# Patient Record
Sex: FEMALE | Race: BLACK OR AFRICAN AMERICAN | URBAN - METROPOLITAN AREA
[De-identification: names, ages, dates, MRNs, and addresses within clinical notes are randomized per-mention and may not be internally consistent; named-entity substitution may affect disease eponyms.]

---

## 2018-10-03 ENCOUNTER — APPOINTMENT (OUTPATIENT)
Dept: URBAN - METROPOLITAN AREA SURGERY 9 | Age: 78
Setting detail: DERMATOLOGY
End: 2018-10-03

## 2018-10-03 DIAGNOSIS — L81.6 OTHER DISORDERS OF DIMINISHED MELANIN FORMATION: ICD-10-CM

## 2018-10-03 DIAGNOSIS — L21.8 OTHER SEBORRHEIC DERMATITIS: ICD-10-CM

## 2018-10-03 DIAGNOSIS — H02.84 EDEMA OF EYELID: ICD-10-CM

## 2018-10-03 PROBLEM — Z92.3 PERSONAL HISTORY OF IRRADIATION: Status: ACTIVE | Noted: 2018-10-03

## 2018-10-03 PROBLEM — L81.9 DISORDER OF PIGMENTATION, UNSPECIFIED: Status: ACTIVE | Noted: 2018-10-03

## 2018-10-03 PROBLEM — J30.1 ALLERGIC RHINITIS DUE TO POLLEN: Status: ACTIVE | Noted: 2018-10-03

## 2018-10-03 PROBLEM — Z85.3 PERSONAL HISTORY OF MALIGNANT NEOPLASM OF BREAST: Status: ACTIVE | Noted: 2018-10-03

## 2018-10-03 PROBLEM — H91.90 UNSPECIFIED HEARING LOSS, UNSPECIFIED EAR: Status: ACTIVE | Noted: 2018-10-03

## 2018-10-03 PROBLEM — H02.842 EDEMA OF RIGHT LOWER EYELID: Status: ACTIVE | Noted: 2018-10-03

## 2018-10-03 PROBLEM — H02.845 EDEMA OF LEFT LOWER EYELID: Status: ACTIVE | Noted: 2018-10-03

## 2018-10-03 PROBLEM — M12.9 ARTHROPATHY, UNSPECIFIED: Status: ACTIVE | Noted: 2018-10-03

## 2018-10-03 PROCEDURE — 99202 OFFICE O/P NEW SF 15 MIN: CPT

## 2018-10-03 PROCEDURE — OTHER COUNSELING: OTHER

## 2018-10-03 PROCEDURE — OTHER PRESCRIPTION: OTHER

## 2018-10-03 PROCEDURE — OTHER TREATMENT REGIMEN: OTHER

## 2018-10-03 RX ORDER — HYDROCORTISONE 25 MG/G
OINTMENT TOPICAL
Qty: 1 | Refills: 3 | Status: ERX | COMMUNITY
Start: 2018-10-03

## 2018-10-03 ASSESSMENT — LOCATION DETAILED DESCRIPTION DERM
LOCATION DETAILED: RIGHT LATERAL INFERIOR EYELID
LOCATION DETAILED: RIGHT NASOLABIAL FOLD
LOCATION DETAILED: LEFT LATERAL INFERIOR EYELID
LOCATION DETAILED: LEFT ORAL COMMISSURE
LOCATION DETAILED: RIGHT SUPERIOR VERMILION BORDER
LOCATION DETAILED: RIGHT ORAL COMMISSURE
LOCATION DETAILED: LEFT NASOLABIAL FOLD
LOCATION DETAILED: LEFT SUPERIOR VERMILION BORDER

## 2018-10-03 ASSESSMENT — LOCATION SIMPLE DESCRIPTION DERM
LOCATION SIMPLE: LEFT INFERIOR EYELID
LOCATION SIMPLE: LEFT UPPER LIP
LOCATION SIMPLE: RIGHT UPPER LIP
LOCATION SIMPLE: RIGHT LIP
LOCATION SIMPLE: LEFT LIP
LOCATION SIMPLE: RIGHT INFERIOR EYELID

## 2018-10-03 ASSESSMENT — LOCATION ZONE DERM
LOCATION ZONE: LIP
LOCATION ZONE: EYELID

## 2018-10-03 NOTE — PROCEDURE: TREATMENT REGIMEN
Initiate Treatment: HC 2.5% ointment as directed\\nSunscreen use
Detail Level: Detailed
Plan: post inflammatory hypopigmentation vs normal variant. Explained to patient that goal of treatment is to prevent this from getting worse but not necessarily bring color back. Pt expressed understanding
Initiate Treatment: HC 2.5% ointment as directed
Detail Level: Zone

## 2018-10-03 NOTE — PROCEDURE: COUNSELING
Detail Level: Detailed
Patient Specific Counseling (Will Not Stick From Patient To Patient): Ddx discussed, use of sunscreen discussed
Patient Specific Counseling: Pt denies uncontrolled thyroid disease nor does she show signs of lupus - feel that this change is more due to age - can use eye creams or go to oculoplastics for follow up. Pt expressed understanding
Detail Level: Simple

## 2018-12-04 ENCOUNTER — APPOINTMENT (OUTPATIENT)
Dept: URBAN - METROPOLITAN AREA SURGERY 9 | Age: 78
Setting detail: DERMATOLOGY
End: 2018-12-04

## 2018-12-04 DIAGNOSIS — L21.8 OTHER SEBORRHEIC DERMATITIS: ICD-10-CM

## 2018-12-04 DIAGNOSIS — L81.0 POSTINFLAMMATORY HYPERPIGMENTATION: ICD-10-CM

## 2018-12-04 PROBLEM — L81.9 DISORDER OF PIGMENTATION, UNSPECIFIED: Status: ACTIVE | Noted: 2018-12-04

## 2018-12-04 PROCEDURE — OTHER TREATMENT REGIMEN: OTHER

## 2018-12-04 PROCEDURE — OTHER COUNSELING: OTHER

## 2018-12-04 PROCEDURE — OTHER OTHER: OTHER

## 2018-12-04 PROCEDURE — 99213 OFFICE O/P EST LOW 20 MIN: CPT

## 2018-12-04 PROCEDURE — OTHER PRESCRIPTION: OTHER

## 2018-12-04 RX ORDER — TACROLIMUS 1 MG/G
OINTMENT TOPICAL
Qty: 1 | Refills: 0 | Status: ERX | COMMUNITY
Start: 2018-12-04

## 2018-12-04 ASSESSMENT — LOCATION SIMPLE DESCRIPTION DERM
LOCATION SIMPLE: LEFT LIP
LOCATION SIMPLE: RIGHT LIP

## 2018-12-04 ASSESSMENT — LOCATION ZONE DERM: LOCATION ZONE: LIP

## 2018-12-04 ASSESSMENT — LOCATION DETAILED DESCRIPTION DERM
LOCATION DETAILED: LEFT NASOLABIAL FOLD
LOCATION DETAILED: LEFT ORAL COMMISSURE
LOCATION DETAILED: RIGHT NASOLABIAL FOLD
LOCATION DETAILED: RIGHT ORAL COMMISSURE

## 2018-12-04 NOTE — PROCEDURE: COUNSELING
Detail Level: Detailed
Patient Specific Counseling (Will Not Stick From Patient To Patient): Vitiligo counseling also provided

## 2018-12-04 NOTE — PROCEDURE: OTHER
Note Text (......Xxx Chief Complaint.): This diagnosis correlates with the
Detail Level: Detailed
Other (Free Text): Photo taken today

## 2018-12-04 NOTE — PROCEDURE: TREATMENT REGIMEN
Detail Level: Simple
Initiate Treatment: Protopic ointment twice day Monday - Friday - side effects were discussed\\nHydrocortisone ointment Saturday and Sunday twice a day
Plan: Mentioned that she has had her thyroid removed and with medications she is well controlled
Plan: Use hydrocortisone PRN